# Patient Record
Sex: MALE | Race: WHITE | NOT HISPANIC OR LATINO | Employment: UNEMPLOYED | URBAN - METROPOLITAN AREA
[De-identification: names, ages, dates, MRNs, and addresses within clinical notes are randomized per-mention and may not be internally consistent; named-entity substitution may affect disease eponyms.]

---

## 2023-02-13 ENCOUNTER — HOSPITAL ENCOUNTER (OUTPATIENT)
Facility: HOSPITAL | Age: 38
Discharge: HOME | End: 2023-02-13
Attending: EMERGENCY MEDICINE | Admitting: EMERGENCY MEDICINE
Payer: MEDICAID

## 2023-02-13 VITALS
OXYGEN SATURATION: 95 % | TEMPERATURE: 97.7 F | WEIGHT: 210 LBS | HEART RATE: 79 BPM | SYSTOLIC BLOOD PRESSURE: 128 MMHG | BODY MASS INDEX: 32.96 KG/M2 | HEIGHT: 67 IN | RESPIRATION RATE: 18 BRPM | DIASTOLIC BLOOD PRESSURE: 85 MMHG

## 2023-02-13 DIAGNOSIS — J06.9 VIRAL UPPER RESPIRATORY ILLNESS: Primary | ICD-10-CM

## 2023-02-13 LAB
FLUAV SUBTYP SPEC NAA+PROBE: NOT DETECTED
FLUBV RNA ISLT QL NAA+PROBE: NOT DETECTED
SARS-COV-2 RNA RESP QL NAA+PROBE: NOT DETECTED
STREP A PCR: NOT DETECTED

## 2023-02-13 PROCEDURE — 87636 SARSCOV2 & INF A&B AMP PRB: CPT | Performed by: EMERGENCY MEDICINE

## 2023-02-13 PROCEDURE — 87651 STREP A DNA AMP PROBE: CPT | Performed by: EMERGENCY MEDICINE

## 2023-02-13 PROCEDURE — C9803 HOPD COVID-19 SPEC COLLECT: HCPCS | Performed by: EMERGENCY MEDICINE

## 2023-02-13 PROCEDURE — G0463 HOSPITAL OUTPT CLINIC VISIT: HCPCS | Performed by: EMERGENCY MEDICINE

## 2023-02-13 PROCEDURE — 99203 OFFICE O/P NEW LOW 30 MIN: CPT

## 2023-02-13 NOTE — DISCHARGE INSTRUCTIONS
Thank you for letting us care for you today.  You can use Tylenol and ibuprofen as needed for pain and fever.  Drink plenty fluids and get rest.  You can use over-the-counter medications as needed for your symptoms.  Follow-up with your primary care provider.  Return for any new or worsening symptoms.  Your COVID influenza and strep were negative today.      Wash/sanitize common household surfaces with antibacterial wipes.  Especially door knobs, light switches. Change bed linens and wash bath towels/washcloths. Frequent handwashing. Cough/sneeze into your sleeve. Treat fever every 6-8 hours with adult/children Tylenol (generic acetaminophen)

## 2023-02-13 NOTE — [facility] PROVIDER NOTE
EMERGENCY DEPARTMENT ENCOUNTER    Room Number:  11/11  Date seen:  2/13/2023  Time seen: 13:18 EST  PCP: Provider, No Known  Historian: Patient    HPI:  Chief complaint: Sore throat, cough  Context:Hernán Grant is a 37 y.o. male who presents to the ED with c/o sore throat, cough.  The patient states that he has been having a sore throat with cough over the last day.  Patient denies any known fever, chest pain, shortness of breath, abdominal pain, nausea vomiting diarrhea.  The patient states that he has not taken anything for his symptoms.  The patient is nontoxic in appearance.  The patient states that he has had a nonproductive cough      Timing: Constant  Duration: 1 day  Location: Sore throat  Radiation: Nonradiating  Quality: Aching  Intensity/Severity: Mild  Associated Symptoms: Nonproductive cough  Aggravating Factors: No known aggravating      The patient was placed in a mask in triage, hand hygiene was performed before and after my interaction with the patient.  I wore a mask, safety glasses and gloves during my entire interaction with the patient.    MEDICAL RECORD REVIEW  None reported    ALLERGIES  Patient has no known allergies.    PAST MEDICAL HISTORY  Active Ambulatory Problems     Diagnosis Date Noted   • No Active Ambulatory Problems     Resolved Ambulatory Problems     Diagnosis Date Noted   • No Resolved Ambulatory Problems     No Additional Past Medical History       PAST SURGICAL HISTORY  History reviewed. No pertinent surgical history.    FAMILY HISTORY  History reviewed. No pertinent family history.    SOCIAL HISTORY  Social History     Socioeconomic History   • Marital status: Single       REVIEW OF SYSTEMS  Review of Systems    All systems reviewed and negative except for those discussed in HPI.     PHYSICAL EXAM    I have reviewed the triage vital signs and nursing notes.    ED Triage Vitals [02/13/23 1238]   Temp Heart Rate Resp BP SpO2   97.7 °F (36.5 °C) 79 18 128/85 95 %      Temp src  Heart Rate Source Patient Position BP Location FiO2 (%)   Oral -- Sitting Left arm --       Physical Exam  Constitutional:       Appearance: Normal appearance.   HENT:      Head: Normocephalic.      Right Ear: Tympanic membrane normal.      Left Ear: Tympanic membrane normal.      Nose: Nose normal.      Mouth/Throat:      Mouth: Mucous membranes are moist.   Eyes:      Pupils: Pupils are equal, round, and reactive to light.   Cardiovascular:      Rate and Rhythm: Normal rate.      Pulses: Normal pulses.      Heart sounds: Normal heart sounds.   Pulmonary:      Effort: Pulmonary effort is normal.   Abdominal:      Palpations: Abdomen is soft.   Musculoskeletal:         General: Normal range of motion.      Cervical back: Normal range of motion.   Skin:     General: Skin is warm.   Neurological:      General: No focal deficit present.      Mental Status: He is alert.   Psychiatric:         Mood and Affect: Mood normal.         Behavior: Behavior normal.         Vital signs and nursing notes reviewed.        LAB RESULTS  Recent Results (from the past 24 hour(s))   Rapid Strep A Screen - Swab, Throat    Collection Time: 02/13/23 12:41 PM    Specimen: Throat; Swab   Result Value Ref Range    STREP A PCR Not Detected Not Detected   COVID-19 and FLU A/B PCR - Swab, Nasopharynx    Collection Time: 02/13/23 12:41 PM    Specimen: Nasopharynx; Swab   Result Value Ref Range    COVID19 Not Detected Not Detected - Ref. Range    Influenza A PCR Not Detected Not Detected    Influenza B PCR Not Detected Not Detected       Ordered the above labs and independently reviewed the results.      RADIOLOGY RESULTS  No Radiology Exams Resulted Within Past 24 Hours       I ordered the above noted radiological studies. Independently reviewed by me and discussed with radiologist.  See dictation above for official radiology interpretation.      Orders placed during this visit:  Orders Placed This Encounter   Procedures   • Rapid Strep A Screen  - Swab, Throat   • COVID-19 and FLU A/B PCR - Swab, Nasopharynx         ED Course as of 02/13/23 1404   Mon Feb 13, 2023   1300 COVID19: Not Detected [KJ]   1312 Influenza A PCR: Not Detected [KJ]   1312 Influenza B PCR: Not Detected [KJ]   1312 STREP A PCR: Not Detected [KJ]      ED Course User Index  [KJ] Shagufta Nice APRN       Medical Decision Making  MEDICAL DECISION  Epic Chart Review: Completed  Comorbidities: None reported  Differentials: There is a high level of influenza, COVID and strep activity in the community currently so patient may likely have been exposed to any of these. Because of this and the symptoms will check labs and treat symptomatically and encourage patient to use motrin/tylenol and drink plenty of fluids and follow-up with their primary care physician.   ; this list is not all inclusive and does not constitute the entirety of considered causes  Lab interpretation:  Labs viewed by me significant for, negative COVID influenza and strep    The patient is a 37-year-old male who reports he has been having a sore throat with a nonproductive cough.  He reports that his symptoms have been going on for approximately 1 day.  The patient was negative for COVID influenza and strep.  The patient was instructed to use over-the-counter medications as needed for his symptoms.  The patient is to follow-up with his primary care provider and return for any new or worsening symptoms.  We discussed his discharge instructions he denies any questions at this time.          I wore protective equipment throughout this patient encounter to include mask. Hand hygiene was performed before donning protective equipment and after removal when leaving the room.    Amount and/or Complexity of Data Reviewed  Labs:  Decision-making details documented in ED Course.          PROCEDURES    Procedures        MEDICATIONS GIVEN IN ER    Medications - No data to display      PROGRESS, DATA ANALYSIS, CONSULTS, AND MEDICAL  DECISION MAKING    All labs have been independently reviewed by me.  All radiology studies have been reviewed by me.   EKG's independently reviewed by me.  Discussion below represents my analysis of pertinent findings related to patient's condition, differential diagnosis, treatment plan and final disposition.        ED Course as of 02/13/23 1404   Mon Feb 13, 2023   1300 COVID19: Not Detected [KJ]   1312 Influenza A PCR: Not Detected [KJ]   1312 Influenza B PCR: Not Detected [KJ]   1312 STREP A PCR: Not Detected [KJ]      ED Course User Index  [KJ] Shagufta Nice, APRN       AS OF 14:04 EST VITALS:    BP - 128/85  HR - 79  TEMP - 97.7 °F (36.5 °C) (Oral)  02 SATS - 95%    I rechecked the patient.  I discussed the patient's labs, radiology findings (including all incidental findings), diagnosis, and plan for discharge.  A repeat exam reveals no new worrisome changes from my initial exam findings.  The patient understands that the fact that they are being discharged does not denote that nothing is abnormal, it indicates that no clinical emergency is present and that they must follow-up as directed in order to properly maintain their health.  Follow-up instructions (specifically listed below) and return to ER precautions were given at this time.  I specifically instructed the patient to follow-up with their PCP.  The patient understands and agrees with the plan, and is ready for discharge.  All questions answered.        DIAGNOSIS  Final diagnoses:   Viral upper respiratory illness           Pt masked in first look. I wore a surgical mask throughout my encounters with the pt. I performed hand hygiene on entry into the pt room and upon exit.     Dictated utilizing Dragon dictation     Note Disclaimer: At [facility], we believe that sharing information builds trust and better relationships. You are receiving this note because you recently visited [facility]. It is possible you will see health information  before a provider has talked with you about it. This kind of information can be easy to misunderstand. To help you fully understand what it means for your health, we urge you to discuss this note with your provider.     MD ATTESTATION NOTE     The SAADIA and I have discussed this patient's history, physical exam, and treatment plan.  I have reviewed the documentation and I affirm the documentation and agree with the treatment and plan.